# Patient Record
Sex: FEMALE | Race: WHITE | Employment: OTHER | URBAN - METROPOLITAN AREA
[De-identification: names, ages, dates, MRNs, and addresses within clinical notes are randomized per-mention and may not be internally consistent; named-entity substitution may affect disease eponyms.]

---

## 2017-01-27 ENCOUNTER — HOSPITAL ENCOUNTER (OUTPATIENT)
Dept: GENERAL RADIOLOGY | Age: 82
Discharge: HOME | End: 2017-01-27
Attending: INTERNAL MEDICINE
Payer: MEDICARE

## 2017-01-27 DIAGNOSIS — K22.4 ESOPHAGOSPASM: ICD-10-CM

## 2017-01-27 DIAGNOSIS — K21.9 ESOPHAGEAL REFLUX: ICD-10-CM

## 2017-01-27 DIAGNOSIS — R13.10 SWALLOWING DIFFICULTY: ICD-10-CM

## 2017-01-27 PROCEDURE — 74011000255 HC RX REV CODE- 255: Performed by: INTERNAL MEDICINE

## 2017-01-27 PROCEDURE — 74011000250 HC RX REV CODE- 250: Performed by: INTERNAL MEDICINE

## 2017-01-27 PROCEDURE — 74220 X-RAY XM ESOPHAGUS 1CNTRST: CPT

## 2017-01-27 RX ADMIN — BARIUM SULFATE 130 ML: 980 POWDER, FOR SUSPENSION ORAL at 10:26

## 2017-01-27 RX ADMIN — BARIUM SULFATE 100 G: 960 POWDER, FOR SUSPENSION ORAL at 10:25

## 2017-01-27 RX ADMIN — BARIUM SULFATE 700 MG: 700 TABLET ORAL at 10:27

## 2017-01-27 RX ADMIN — ANTACID/ANTIFLATULENT 4 G: 380; 550; 10; 10 GRANULE, EFFERVESCENT ORAL at 10:26

## 2017-04-26 ENCOUNTER — HOSPITAL ENCOUNTER (OUTPATIENT)
Dept: MRI IMAGING | Age: 82
Discharge: HOME | End: 2017-04-26
Attending: PSYCHIATRY & NEUROLOGY
Payer: MEDICARE

## 2017-04-26 DIAGNOSIS — R41.3 MEMORY LOSS: ICD-10-CM

## 2017-04-26 PROCEDURE — 70551 MRI BRAIN STEM W/O DYE: CPT

## 2019-09-04 ENCOUNTER — HOSPITAL ENCOUNTER (OUTPATIENT)
Dept: PREADMISSION TESTING | Age: 84
Discharge: HOME | End: 2019-09-04
Attending: SURGERY
Payer: MEDICARE

## 2019-09-04 LAB
ALBUMIN SERPL-MCNC: 3.4 G/DL (ref 3.4–5)
ALBUMIN/GLOB SERPL: 1.1 {RATIO} (ref 0.8–1.7)
ALP SERPL-CCNC: 91 U/L (ref 45–117)
ALT SERPL-CCNC: 30 U/L (ref 13–56)
ANION GAP SERPL CALC-SCNC: 7 MMOL/L (ref 3–18)
AST SERPL-CCNC: 28 U/L (ref 10–38)
ATRIAL RATE: 74 BPM
BASOPHILS # BLD: 0 K/UL (ref 0–0.1)
BASOPHILS NFR BLD: 0 % (ref 0–2)
BILIRUB SERPL-MCNC: 0.4 MG/DL (ref 0.2–1)
BUN SERPL-MCNC: 24 MG/DL (ref 7–18)
BUN/CREAT SERPL: 28 (ref 12–20)
CALCIUM SERPL-MCNC: 8.7 MG/DL (ref 8.5–10.1)
CALCULATED P AXIS, ECG09: 75 DEGREES
CALCULATED R AXIS, ECG10: 64 DEGREES
CALCULATED T AXIS, ECG11: 44 DEGREES
CHLORIDE SERPL-SCNC: 109 MMOL/L (ref 100–111)
CO2 SERPL-SCNC: 30 MMOL/L (ref 21–32)
CREAT SERPL-MCNC: 0.87 MG/DL (ref 0.6–1.3)
DIAGNOSIS, 93000: NORMAL
DIFFERENTIAL METHOD BLD: ABNORMAL
EOSINOPHIL # BLD: 0 K/UL (ref 0–0.4)
EOSINOPHIL NFR BLD: 1 % (ref 0–5)
ERYTHROCYTE [DISTWIDTH] IN BLOOD BY AUTOMATED COUNT: 15.1 % (ref 11.6–14.5)
GLOBULIN SER CALC-MCNC: 3.2 G/DL (ref 2–4)
GLUCOSE SERPL-MCNC: 81 MG/DL (ref 74–99)
HCT VFR BLD AUTO: 44.2 % (ref 35–45)
HGB BLD-MCNC: 14.3 G/DL (ref 12–16)
LYMPHOCYTES # BLD: 1.2 K/UL (ref 0.9–3.6)
LYMPHOCYTES NFR BLD: 22 % (ref 21–52)
MCH RBC QN AUTO: 30.4 PG (ref 24–34)
MCHC RBC AUTO-ENTMCNC: 32.4 G/DL (ref 31–37)
MCV RBC AUTO: 93.8 FL (ref 74–97)
MONOCYTES # BLD: 0.5 K/UL (ref 0.05–1.2)
MONOCYTES NFR BLD: 9 % (ref 3–10)
NEUTS SEG # BLD: 3.7 K/UL (ref 1.8–8)
NEUTS SEG NFR BLD: 68 % (ref 40–73)
P-R INTERVAL, ECG05: 170 MS
PLATELET # BLD AUTO: 212 K/UL (ref 135–420)
PMV BLD AUTO: 9.6 FL (ref 9.2–11.8)
POTASSIUM SERPL-SCNC: 4.2 MMOL/L (ref 3.5–5.5)
PROT SERPL-MCNC: 6.6 G/DL (ref 6.4–8.2)
Q-T INTERVAL, ECG07: 418 MS
QRS DURATION, ECG06: 102 MS
QTC CALCULATION (BEZET), ECG08: 463 MS
RBC # BLD AUTO: 4.71 M/UL (ref 4.2–5.3)
SODIUM SERPL-SCNC: 146 MMOL/L (ref 136–145)
VENTRICULAR RATE, ECG03: 74 BPM
WBC # BLD AUTO: 5.4 K/UL (ref 4.6–13.2)

## 2019-09-04 PROCEDURE — 80053 COMPREHEN METABOLIC PANEL: CPT

## 2019-09-04 PROCEDURE — 85025 COMPLETE CBC W/AUTO DIFF WBC: CPT

## 2019-09-04 PROCEDURE — 36415 COLL VENOUS BLD VENIPUNCTURE: CPT

## 2019-09-04 PROCEDURE — 93005 ELECTROCARDIOGRAM TRACING: CPT

## 2019-09-04 NOTE — H&P (VIEW-ONLY)
PATIENT: Bandar Chavarria  YOB: 1935  DATE: 08/12/2019 2:45 PM   VISIT TYPE: Consult  HISTORIAN: self  _____________________________________________________________    Completed Orders (this encounter)  Order Interpretation Result Next Lab Date   Dietary education for weight gain        Assessment/Plan  # Detail Type Description    1. 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 Impression left hand SCC, discussed excision in the OR. Patient Plan excision SCC left hand         2. Assessment Body mass index (BMI) 21.0-21.9, adult (Z68.21). Plan Orders Today's instructions / counseling include(s) Dietary education for weight gain. This 81year old female presents for Skin Lesion. History of Present Illness:  1. Skin Lesion   The patient presents with Skin Lesion that began gradually. The problem is moderate, worsened and occurs continuously. Area(s) of concern include the dorsum left hand. The lesion(s) of concern is described as flesh colored color and ulcerated. Aggravating factors include scratching. The patient does not report any relieving factors. The patient reports no change in size and shape of the mole(s). Additional information: 81year old female being seen today for a skin lesion. Patient's son states she has a squamous cell cancer on her left wrist and forearm . It was frozen twice but has never fallen off. PROBLEM LIST:   Problem List reviewed.    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     Gastric reflux 01/26/2017 N     Abdominal aortic aneurysm 01/18/2016 N     Difficulty swallowing solids 01/18/2016 N           PAST MEDICAL/SURGICAL HISTORY  (Detailed)    Disease/disorder Onset Date Management Date Comments   Dysphagia  EGD w/biopsy 12/28/2015    Mitral Valve Prolapse  Surgical intervention 2013    PNA       Cancer       Migraine       Heart disease       Primary open angle glaucoma         Family History  (Detailed)  Relationship Family Member Name  Age at Death Condition Onset Age Cause of Death   Father  Y 61      Mother  Y 80          Social History:  (Detailed)  Tobacco use reviewed. The patient is right-handed. Preferred language is [place]. MARITAL STATUS/FAMILY/SOCIAL SUPPORT  Currently . CHILDREN  Has children:  Tobacco use status: Ex-cigarette smoker. Smoking status: Former smoker. SMOKING STATUS  Use Status Type Smoking Status Usage Per Day Years Used Total Pack Years   yes Cigarette Former smoker        TOBACCO/VAPING EXPOSURE  There is passive smoke exposure. ALCOHOL  There is a history of alcohol use. Type: Wine. 1 glass consumed yearly. CAFFEINE  The patient uses caffeine: tea - 2 cups a day. LIFESTYLE  Moderate activity level. Exercises daily.           Medications (active prior to today)  Medication Instructions Start Date Stop Date Refilled Elsewhere   aspirin 81 mg tablet,delayed release take 1 tablet by oral route  every day 2015   N   nitroglycerin 0.4 mg sublingual tablet place 1 tablet by sublingual route at the 1st sign of difficulty swallowing ; may repeat every 5 min until relief; persists 2017   N   simvastatin 40 mg tablet take 1 tablet by oral route  every day in the evening 2018 N   metoprolol succinate ER 50 mg tablet,extended release 24 hr take 0.5 tablet by oral route  every day 2018 N   memantine 10 mg tablet take 1 tablet by oral route 2 times every day 2019   N   Voltaren 1 % topical gel apply (2G)  by topical route 4 times every day to the affected knee 2019 N   omeprazole 40 mg capsule,delayed release take 1 capsule by oral route  every day before breakfast 2019 N   latanoprost 0.005 % eye drops instill 1 drop by ophthalmic route  every day into both eyes in the evening 06/21/2019 06/21/2019 N     Patient Status   Completed with information received for patient transitioning into care. Medication Reconciliation  Medications reconciled today. Medication Reviewed  Adherence Medication Name Sig Desc Elsewhere Status   taking as directed omeprazole 40 mg capsule,delayed release take 1 capsule by oral route  every day before breakfast N Verified   taking as directed simvastatin 40 mg tablet take 1 tablet by oral route  every day in the evening N Verified   taking as directed memantine 10 mg tablet take 1 tablet by oral route 2 times every day N Verified   taking as directed latanoprost 0.005 % eye drops instill 1 drop by ophthalmic route  every day into both eyes in the evening N Verified   taking as directed metoprolol succinate ER 50 mg tablet,extended release 24 hr take 0.5 tablet by oral route  every day N Verified   taking as directed aspirin 81 mg tablet,delayed release take 1 tablet by oral route  every day N Verified   taking as directed nitroglycerin 0.4 mg sublingual tablet place 1 tablet by sublingual route at the 1st sign of difficulty swallowing ; may repeat every 5 min until relief; persists N Verified   taking as directed Voltaren 1 % topical gel apply (2G)  by topical route 4 times every day to the affected knee N Verified     Allergies:  Ingredient Reaction (Severity) Medication Name Comment   AMOXICILLIN TRIHYDRATE Rash AUGMENTIN    ATROPINE      BELLADONNA ALKALOIDS      CEPHALEXIN MONOHYDRATE Rash KEFLEX    CODEINE Trouble Breathing     MORPHINE Trouble Breathing     OXYMETAZOLINE      POTASSIUM CLAVULANATE Rash AUGMENTIN    PROCHLORPERAZINE  Compazine    PROCHLORPERAZINE EDISYLATE  Compazine    PROCHLORPERAZINE MALEATE  Compazine    PROPOFOL  Diprivan    SULFA (SULFONAMIDE ANTIBIOTICS) Anaphylaxis     Reviewed, no changes.     Review of Systems  System Neg/Pos Details   Constitutional Negative Fever, Night sweats and Weight loss. ENMT Negative Hearing loss, Tinnitus, Vertigo and Voice change. Eyes Negative Diplopia and Vision loss. Respiratory Negative Asthma, Cough, Dyspnea, Hemoptysis, Known TB exposure and Wheezing. Cardio Positive Chest pain. Cardio Negative Claudication, Edema, Irregular heartbeat/palpitations and Thrombophlebitis. Cardio Comments Mild chest pain with exertion. GI Negative Bloating, Dysphagia, Hemorrhoids, Jaundice and Reflux.  Negative Dysuria, Nocturia, Passage stone/gravel and Urinary incontinence. Endocrine Negative Cold intolerance and Goiter. Neuro Negative Headache and Syncope. Integumentary Positive Skin lesion. Integumentary Negative Change in shape/size of mole(s). MS Positive Back pain, Bone/joint symptoms, Joint pain. Hema/Lymph Negative Easy bleeding and Easy bruising. Allergic/Immuno Negative Contact allergy and Contact dermatitis. Vital Signs     Height  Time ft in cm Last Measured Height Position   2:52 PM 5.0 4.00 162.56 08/12/2019 Standing     Weight/BSA/BMI  Time lb oz kg Context BMI kg/m2 BSA m2   2:52 .60  56.971 dressed with shoes 21.56      Blood Pressure  Time BP mm/Hg Position Side Site Method Cuff Size   2:52 /60 sitting right arm manual adult large     Temperature/Pulse/Respiration  Time Temp F Temp C Temp Site Pulse/min Pattern Resp/ min   2:52 PM 98.00 36.67 oral 72 regular 12     Pulse Oximetry/FIO2  Time Pulse Ox (Rest %) Pulse Ox (Amb %) O2 Sat O2 L/Min Timing FiO2 % L/min Delivery Method Finger Probe   2:52 PM 98  RA      L Index     Pain Scale  Time Pain Score Method   2:52 PM 0/10 Numeric Pain Intensity Scale     Measured By  Time Measured by   2:52 PM Charlee Mooney       Physical Exam:  Exam Findings Details   Constitutional Normal No acute distress. Well nourished. Well developed.    Eyes Normal General - Right: Normal, Left: Normal. Sclera - Right: Normal, Left: Normal.   Neck Exam Normal Inspection - Normal.   Respiratory Normal Cough - Absent. Effort - Normal.   Cardiovascular Normal Heart rate - Regular rate. Rhythm - Regular. Skin * Body areas examined - Left upper ext. Detailed inspection - Visual lesions: nodule(s), Color: flesh, Shape: umbilicated, Size: 1cm, Side: left, Location: hand. Extremity Normal No Cyanosis. No Edema. Neurological Normal Level of consciousness - Normal. Orientation - Normal. Memory - Normal. Hand dominance - Right-handed. Psychiatric Normal Orientation - Oriented to time, place, person & situation. No agitation. Not anxious. Appropriate mood and affect.          Medications (added, continued, or stopped this visit):  Start Date Medication Directions PRN Status PRN Reason Instruction Stop Date   12/22/2015 aspirin 81 mg tablet,delayed release take 1 tablet by oral route  every day N      06/21/2019 latanoprost 0.005 % eye drops instill 1 drop by ophthalmic route  every day into both eyes in the evening N      01/16/2019 memantine 10 mg tablet take 1 tablet by oral route 2 times every day N      12/27/2018 metoprolol succinate ER 50 mg tablet,extended release 24 hr take 0.5 tablet by oral route  every day N      04/27/2017 nitroglycerin 0.4 mg sublingual tablet place 1 tablet by sublingual route at the 1st sign of difficulty swallowing ; may repeat every 5 min until relief; persists N      06/19/2019 omeprazole 40 mg capsule,delayed release take 1 capsule by oral route  every day before breakfast N      09/18/2018 simvastatin 40 mg tablet take 1 tablet by oral route  every day in the evening N      02/06/2019 Voltaren 1 % topical gel apply (2G)  by topical route 4 times every day to the affected knee N          Active Patient Care Team Members    Name Contact Agency Type Support Role Relationship Active Date Inactive Date Specialty   Atif Ballesteros   Patient provider PCP   Family Practice   Atif Ballesteros   primary care provider    Family Pract   Renata Reed encounter provider    Gastroenterology       Provider: Lety Velazquez MD 08/12/2019 02:45 PM  Document generated by:  Lety Velazquez 08/13/2019 11:49 AM                           Electronically signed by Leyt Velazquez MD on 08/13/2019 12:10 PM

## 2019-09-11 ENCOUNTER — ANESTHESIA EVENT (OUTPATIENT)
Dept: SURGERY | Age: 84
End: 2019-09-11
Payer: MEDICARE

## 2019-09-11 RX ORDER — FLUMAZENIL 0.1 MG/ML
0.2 INJECTION INTRAVENOUS
Status: CANCELLED | OUTPATIENT
Start: 2019-09-11

## 2019-09-11 RX ORDER — ONDANSETRON 2 MG/ML
4 INJECTION INTRAMUSCULAR; INTRAVENOUS ONCE
Status: CANCELLED | OUTPATIENT
Start: 2019-09-11 | End: 2019-09-11

## 2019-09-11 RX ORDER — ALBUTEROL SULFATE 0.83 MG/ML
2.5 SOLUTION RESPIRATORY (INHALATION) AS NEEDED
Status: CANCELLED | OUTPATIENT
Start: 2019-09-11

## 2019-09-11 RX ORDER — FENTANYL CITRATE 50 UG/ML
12.5 INJECTION, SOLUTION INTRAMUSCULAR; INTRAVENOUS AS NEEDED
Status: CANCELLED | OUTPATIENT
Start: 2019-09-11

## 2019-09-11 RX ORDER — KETOROLAC TROMETHAMINE 30 MG/ML
15 INJECTION, SOLUTION INTRAMUSCULAR; INTRAVENOUS
Status: CANCELLED | OUTPATIENT
Start: 2019-09-11 | End: 2019-09-12

## 2019-09-11 RX ORDER — SODIUM CHLORIDE, SODIUM LACTATE, POTASSIUM CHLORIDE, CALCIUM CHLORIDE 600; 310; 30; 20 MG/100ML; MG/100ML; MG/100ML; MG/100ML
1000 INJECTION, SOLUTION INTRAVENOUS CONTINUOUS
Status: CANCELLED | OUTPATIENT
Start: 2019-09-11

## 2019-09-11 RX ORDER — NALOXONE HYDROCHLORIDE 0.4 MG/ML
0.2 INJECTION, SOLUTION INTRAMUSCULAR; INTRAVENOUS; SUBCUTANEOUS AS NEEDED
Status: CANCELLED | OUTPATIENT
Start: 2019-09-11

## 2019-09-11 RX ORDER — FENTANYL CITRATE 50 UG/ML
25 INJECTION, SOLUTION INTRAMUSCULAR; INTRAVENOUS
Status: CANCELLED | OUTPATIENT
Start: 2019-09-11

## 2019-09-11 RX ORDER — DIPHENHYDRAMINE HYDROCHLORIDE 50 MG/ML
12.5 INJECTION, SOLUTION INTRAMUSCULAR; INTRAVENOUS
Status: CANCELLED | OUTPATIENT
Start: 2019-09-11

## 2019-09-11 NOTE — H&P
PATIENT: Neo Tamez  YOB: 1935  DATE: 08/12/2019 2:45 PM   VISIT TYPE: Consult  HISTORIAN: self  _____________________________________________________________    Completed Orders (this encounter)  Order Interpretation Result Next Lab Date   Dietary education for weight gain        Assessment/Plan  # Detail Type Description    1. 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 Impression left hand SCC, discussed excision in the OR. Patient Plan excision SCC left hand         2. Assessment Body mass index (BMI) 21.0-21.9, adult (Z68.21). Plan Orders Today's instructions / counseling include(s) Dietary education for weight gain. This 81year old female presents for Skin Lesion. History of Present Illness:  1. Skin Lesion   The patient presents with Skin Lesion that began gradually. The problem is moderate, worsened and occurs continuously. Area(s) of concern include the dorsum left hand. The lesion(s) of concern is described as flesh colored color and ulcerated. Aggravating factors include scratching. The patient does not report any relieving factors. The patient reports no change in size and shape of the mole(s). Additional information: 81year old female being seen today for a skin lesion. Patient's son states she has a squamous cell cancer on her left wrist and forearm . It was frozen twice but has never fallen off. PROBLEM LIST:   Problem List reviewed.    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     Gastric reflux 01/26/2017 N     Abdominal aortic aneurysm 01/18/2016 N     Difficulty swallowing solids 01/18/2016 N           PAST MEDICAL/SURGICAL HISTORY  (Detailed)    Disease/disorder Onset Date Management Date Comments   Dysphagia  EGD w/biopsy 12/28/2015    Mitral Valve Prolapse  Surgical intervention 2013    PNA       Cancer       Migraine       Heart disease       Primary open angle glaucoma         Family History  (Detailed)  Relationship Family Member Name  Age at Death Condition Onset Age Cause of Death   Father  Y 61      Mother  Y 80          Social History:  (Detailed)  Tobacco use reviewed. The patient is right-handed. Preferred language is [place]. MARITAL STATUS/FAMILY/SOCIAL SUPPORT  Currently . CHILDREN  Has children:  Tobacco use status: Ex-cigarette smoker. Smoking status: Former smoker. SMOKING STATUS  Use Status Type Smoking Status Usage Per Day Years Used Total Pack Years   yes Cigarette Former smoker        TOBACCO/VAPING EXPOSURE  There is passive smoke exposure. ALCOHOL  There is a history of alcohol use. Type: Wine. 1 glass consumed yearly. CAFFEINE  The patient uses caffeine: tea - 2 cups a day. LIFESTYLE  Moderate activity level. Exercises daily.           Medications (active prior to today)  Medication Instructions Start Date Stop Date Refilled Elsewhere   aspirin 81 mg tablet,delayed release take 1 tablet by oral route  every day 2015   N   nitroglycerin 0.4 mg sublingual tablet place 1 tablet by sublingual route at the 1st sign of difficulty swallowing ; may repeat every 5 min until relief; persists 2017   N   simvastatin 40 mg tablet take 1 tablet by oral route  every day in the evening 2018 N   metoprolol succinate ER 50 mg tablet,extended release 24 hr take 0.5 tablet by oral route  every day 2018 N   memantine 10 mg tablet take 1 tablet by oral route 2 times every day 2019   N   Voltaren 1 % topical gel apply (2G)  by topical route 4 times every day to the affected knee 2019 N   omeprazole 40 mg capsule,delayed release take 1 capsule by oral route  every day before breakfast 2019 N   latanoprost 0.005 % eye drops instill 1 drop by ophthalmic route  every day into both eyes in the evening 06/21/2019 06/21/2019 N     Patient Status   Completed with information received for patient transitioning into care. Medication Reconciliation  Medications reconciled today. Medication Reviewed  Adherence Medication Name Sig Desc Elsewhere Status   taking as directed omeprazole 40 mg capsule,delayed release take 1 capsule by oral route  every day before breakfast N Verified   taking as directed simvastatin 40 mg tablet take 1 tablet by oral route  every day in the evening N Verified   taking as directed memantine 10 mg tablet take 1 tablet by oral route 2 times every day N Verified   taking as directed latanoprost 0.005 % eye drops instill 1 drop by ophthalmic route  every day into both eyes in the evening N Verified   taking as directed metoprolol succinate ER 50 mg tablet,extended release 24 hr take 0.5 tablet by oral route  every day N Verified   taking as directed aspirin 81 mg tablet,delayed release take 1 tablet by oral route  every day N Verified   taking as directed nitroglycerin 0.4 mg sublingual tablet place 1 tablet by sublingual route at the 1st sign of difficulty swallowing ; may repeat every 5 min until relief; persists N Verified   taking as directed Voltaren 1 % topical gel apply (2G)  by topical route 4 times every day to the affected knee N Verified     Allergies:  Ingredient Reaction (Severity) Medication Name Comment   AMOXICILLIN TRIHYDRATE Rash AUGMENTIN    ATROPINE      BELLADONNA ALKALOIDS      CEPHALEXIN MONOHYDRATE Rash KEFLEX    CODEINE Trouble Breathing     MORPHINE Trouble Breathing     OXYMETAZOLINE      POTASSIUM CLAVULANATE Rash AUGMENTIN    PROCHLORPERAZINE  Compazine    PROCHLORPERAZINE EDISYLATE  Compazine    PROCHLORPERAZINE MALEATE  Compazine    PROPOFOL  Diprivan    SULFA (SULFONAMIDE ANTIBIOTICS) Anaphylaxis     Reviewed, no changes.     Review of Systems  System Neg/Pos Details   Constitutional Negative Fever, Night sweats and Weight loss. ENMT Negative Hearing loss, Tinnitus, Vertigo and Voice change. Eyes Negative Diplopia and Vision loss. Respiratory Negative Asthma, Cough, Dyspnea, Hemoptysis, Known TB exposure and Wheezing. Cardio Positive Chest pain. Cardio Negative Claudication, Edema, Irregular heartbeat/palpitations and Thrombophlebitis. Cardio Comments Mild chest pain with exertion. GI Negative Bloating, Dysphagia, Hemorrhoids, Jaundice and Reflux.  Negative Dysuria, Nocturia, Passage stone/gravel and Urinary incontinence. Endocrine Negative Cold intolerance and Goiter. Neuro Negative Headache and Syncope. Integumentary Positive Skin lesion. Integumentary Negative Change in shape/size of mole(s). MS Positive Back pain, Bone/joint symptoms, Joint pain. Hema/Lymph Negative Easy bleeding and Easy bruising. Allergic/Immuno Negative Contact allergy and Contact dermatitis. Vital Signs     Height  Time ft in cm Last Measured Height Position   2:52 PM 5.0 4.00 162.56 08/12/2019 Standing     Weight/BSA/BMI  Time lb oz kg Context BMI kg/m2 BSA m2   2:52 .60  56.971 dressed with shoes 21.56      Blood Pressure  Time BP mm/Hg Position Side Site Method Cuff Size   2:52 /60 sitting right arm manual adult large     Temperature/Pulse/Respiration  Time Temp F Temp C Temp Site Pulse/min Pattern Resp/ min   2:52 PM 98.00 36.67 oral 72 regular 12     Pulse Oximetry/FIO2  Time Pulse Ox (Rest %) Pulse Ox (Amb %) O2 Sat O2 L/Min Timing FiO2 % L/min Delivery Method Finger Probe   2:52 PM 98  RA      L Index     Pain Scale  Time Pain Score Method   2:52 PM 0/10 Numeric Pain Intensity Scale     Measured By  Time Measured by   2:52 PM Arvil Half       Physical Exam:  Exam Findings Details   Constitutional Normal No acute distress. Well nourished. Well developed.    Eyes Normal General - Right: Normal, Left: Normal. Sclera - Right: Normal, Left: Normal.   Neck Exam Normal Inspection - Normal.   Respiratory Normal Cough - Absent. Effort - Normal.   Cardiovascular Normal Heart rate - Regular rate. Rhythm - Regular. Skin * Body areas examined - Left upper ext. Detailed inspection - Visual lesions: nodule(s), Color: flesh, Shape: umbilicated, Size: 1cm, Side: left, Location: hand. Extremity Normal No Cyanosis. No Edema. Neurological Normal Level of consciousness - Normal. Orientation - Normal. Memory - Normal. Hand dominance - Right-handed. Psychiatric Normal Orientation - Oriented to time, place, person & situation. No agitation. Not anxious. Appropriate mood and affect.          Medications (added, continued, or stopped this visit):  Start Date Medication Directions PRN Status PRN Reason Instruction Stop Date   12/22/2015 aspirin 81 mg tablet,delayed release take 1 tablet by oral route  every day N      06/21/2019 latanoprost 0.005 % eye drops instill 1 drop by ophthalmic route  every day into both eyes in the evening N      01/16/2019 memantine 10 mg tablet take 1 tablet by oral route 2 times every day N      12/27/2018 metoprolol succinate ER 50 mg tablet,extended release 24 hr take 0.5 tablet by oral route  every day N      04/27/2017 nitroglycerin 0.4 mg sublingual tablet place 1 tablet by sublingual route at the 1st sign of difficulty swallowing ; may repeat every 5 min until relief; persists N      06/19/2019 omeprazole 40 mg capsule,delayed release take 1 capsule by oral route  every day before breakfast N      09/18/2018 simvastatin 40 mg tablet take 1 tablet by oral route  every day in the evening N      02/06/2019 Voltaren 1 % topical gel apply (2G)  by topical route 4 times every day to the affected knee N          Active Patient Care Team Members    Name Contact Agency Type Support Role Relationship Active Date Inactive Date Specialty   Atif Ballesteros   Patient provider PCP   Family Practice   Atif Ballesteros   primary care provider    Family Pract   Renata Reed encounter provider    Gastroenterology       Provider: Frankie Venegas MD 08/12/2019 02:45 PM  Document generated by:  Frankie Venegas 08/13/2019 11:49 AM                           Electronically signed by Frankie Venegas MD on 08/13/2019 12:10 PM

## 2019-09-12 ENCOUNTER — ANESTHESIA (OUTPATIENT)
Dept: SURGERY | Age: 84
End: 2019-09-12
Payer: MEDICARE

## 2019-09-12 ENCOUNTER — HOSPITAL ENCOUNTER (OUTPATIENT)
Age: 84
Setting detail: OUTPATIENT SURGERY
Discharge: HOME | End: 2019-09-12
Attending: SURGERY | Admitting: SURGERY
Payer: MEDICARE

## 2019-09-12 VITALS
SYSTOLIC BLOOD PRESSURE: 169 MMHG | DIASTOLIC BLOOD PRESSURE: 97 MMHG | RESPIRATION RATE: 16 BRPM | WEIGHT: 128.25 LBS | OXYGEN SATURATION: 100 % | HEIGHT: 63 IN | BODY MASS INDEX: 22.72 KG/M2 | HEART RATE: 61 BPM | TEMPERATURE: 97.7 F

## 2019-09-12 PROCEDURE — 77030002996 HC SUT SLK J&J -A: Performed by: SURGERY

## 2019-09-12 PROCEDURE — 76060000032 HC ANESTHESIA 0.5 TO 1 HR: Performed by: SURGERY

## 2019-09-12 PROCEDURE — 77030040361 HC SLV COMPR DVT MDII -B: Performed by: SURGERY

## 2019-09-12 PROCEDURE — 77030002916 HC SUT ETHLN J&J -A: Performed by: SURGERY

## 2019-09-12 PROCEDURE — 77030011267 HC ELECTRD BLD COVD -A: Performed by: SURGERY

## 2019-09-12 PROCEDURE — 74011000250 HC RX REV CODE- 250: Performed by: SURGERY

## 2019-09-12 PROCEDURE — 88305 TISSUE EXAM BY PATHOLOGIST: CPT

## 2019-09-12 PROCEDURE — 74011250636 HC RX REV CODE- 250/636

## 2019-09-12 PROCEDURE — 74011250636 HC RX REV CODE- 250/636: Performed by: SURGERY

## 2019-09-12 PROCEDURE — 74011000250 HC RX REV CODE- 250

## 2019-09-12 PROCEDURE — 77030018836 HC SOL IRR NACL ICUM -A: Performed by: SURGERY

## 2019-09-12 PROCEDURE — 77030002933 HC SUT MCRYL J&J -A: Performed by: SURGERY

## 2019-09-12 PROCEDURE — 76010000138 HC OR TIME 0.5 TO 1 HR: Performed by: SURGERY

## 2019-09-12 PROCEDURE — 77030020782 HC GWN BAIR PAWS FLX 3M -B: Performed by: SURGERY

## 2019-09-12 PROCEDURE — 76210000021 HC REC RM PH II 0.5 TO 1 HR: Performed by: SURGERY

## 2019-09-12 RX ORDER — LIDOCAINE HYDROCHLORIDE 20 MG/ML
INJECTION, SOLUTION EPIDURAL; INFILTRATION; INTRACAUDAL; PERINEURAL AS NEEDED
Status: DISCONTINUED | OUTPATIENT
Start: 2019-09-12 | End: 2019-09-12 | Stop reason: HOSPADM

## 2019-09-12 RX ORDER — PROPOFOL 10 MG/ML
INJECTION, EMULSION INTRAVENOUS AS NEEDED
Status: DISCONTINUED | OUTPATIENT
Start: 2019-09-12 | End: 2019-09-12 | Stop reason: HOSPADM

## 2019-09-12 RX ORDER — SODIUM CHLORIDE, SODIUM LACTATE, POTASSIUM CHLORIDE, CALCIUM CHLORIDE 600; 310; 30; 20 MG/100ML; MG/100ML; MG/100ML; MG/100ML
125 INJECTION, SOLUTION INTRAVENOUS CONTINUOUS
Status: DISCONTINUED | OUTPATIENT
Start: 2019-09-12 | End: 2019-09-12 | Stop reason: HOSPADM

## 2019-09-12 RX ORDER — BUPIVACAINE HYDROCHLORIDE 5 MG/ML
INJECTION, SOLUTION EPIDURAL; INTRACAUDAL AS NEEDED
Status: DISCONTINUED | OUTPATIENT
Start: 2019-09-12 | End: 2019-09-12 | Stop reason: HOSPADM

## 2019-09-12 RX ORDER — ONDANSETRON 2 MG/ML
INJECTION INTRAMUSCULAR; INTRAVENOUS AS NEEDED
Status: DISCONTINUED | OUTPATIENT
Start: 2019-09-12 | End: 2019-09-12 | Stop reason: HOSPADM

## 2019-09-12 RX ADMIN — ONDANSETRON 4 MG: 2 INJECTION INTRAMUSCULAR; INTRAVENOUS at 08:16

## 2019-09-12 RX ADMIN — PROPOFOL 20 MG: 10 INJECTION, EMULSION INTRAVENOUS at 08:10

## 2019-09-12 RX ADMIN — PROPOFOL 20 MG: 10 INJECTION, EMULSION INTRAVENOUS at 08:06

## 2019-09-12 RX ADMIN — SODIUM CHLORIDE, SODIUM LACTATE, POTASSIUM CHLORIDE, AND CALCIUM CHLORIDE 125 ML/HR: 600; 310; 30; 20 INJECTION, SOLUTION INTRAVENOUS at 07:34

## 2019-09-12 RX ADMIN — LIDOCAINE HYDROCHLORIDE 40 MG: 20 INJECTION, SOLUTION EPIDURAL; INFILTRATION; INTRACAUDAL; PERINEURAL at 08:06

## 2019-09-12 NOTE — ANESTHESIA POSTPROCEDURE EVALUATION
Post-Anesthesia Evaluation and Assessment    Cardiovascular Function/Vital Signs  Visit Vitals  BP (!) 156/97 (BP 1 Location: Right arm, BP Patient Position: At rest)   Pulse 63   Temp 36.5 °C (97.7 °F)   Resp 16   Ht 5' 3\" (1.6 m)   Wt 58.2 kg (128 lb 4 oz)   SpO2 98%   BMI 22.72 kg/m²       Patient is status post Procedure(s):  EXCISION LEFT HAND SQUAMOUS CELL CANCER AND LEFT ARM SKIN LESION TIMES TWO \"SPEC POP\". Nausea/Vomiting: Controlled. Postoperative hydration reviewed and adequate. Pain:  Pain Scale 1: Numeric (0 - 10) (09/12/19 0858)  Pain Intensity 1: 0 (09/12/19 0858)   Managed. Neurological Status:   Neuro (WDL): Within Defined Limits (09/12/19 0858)   At baseline. Mental Status and Level of Consciousness: Arousable. Pulmonary Status:   O2 Device: Room air (09/12/19 0858)   Adequate oxygenation and airway patent. Complications related to anesthesia: None    Post-anesthesia assessment completed. No concerns. Patient has met all discharge requirements.     Signed By: Kendra Thrasher CRNA    September 12, 2019

## 2019-09-12 NOTE — DISCHARGE INSTRUCTIONS
DISCHARGE SUMMARY from Nurse    PATIENT INSTRUCTIONS:    After general anesthesia or intravenous sedation, for 24 hours or while taking prescription Narcotics:  · Limit your activities  · Do not drive and operate hazardous machinery  · Do not make important personal or business decisions  · Do  not drink alcoholic beverages  · If you have not urinated within 8 hours after discharge, please contact your surgeon on call. Report the following to your surgeon:  · Excessive pain, swelling, redness or odor of or around the surgical area  · Temperature over 100.5  · Nausea and vomiting lasting longer than 4 hours or if unable to take medications  · Any signs of decreased circulation or nerve impairment to extremity: change in color, persistent  numbness, tingling, coldness or increase pain  · Any questions    What to do at Home:  [facility] 1 WEEK CALL FOR APPT    If you experience any of the following symptoms heavy bleeding, fevers, severe pain, please follow up with dr Anila Perez    *  Please give a list of your current medications to your Primary Care Provider. *  Please update this list whenever your medications are discontinued, doses are      changed, or new medications (including over-the-counter products) are added. *  Please carry medication information at all times in case of emergency situations. These are general instructions for a healthy lifestyle:    No smoking/ No tobacco products/ Avoid exposure to second hand smoke  Surgeon General's Warning:  Quitting smoking now greatly reduces serious risk to your health.     Obesity, smoking, and sedentary lifestyle greatly increases your risk for illness    A healthy diet, regular physical exercise & weight monitoring are important for maintaining a healthy lifestyle    You may be retaining fluid if you have a history of heart failure or if you experience any of the following symptoms: Weight gain of 3 pounds or more overnight or 5 pounds in a week, increased swelling in our hands or feet or shortness of breath while lying flat in bed. Please call your doctor as soon as you notice any of these symptoms; do not wait until your next office visit. The discharge information has been reviewed with the patient and caregiver. The patient and caregiver verbalized understanding. Discharge medications reviewed with the patient and caregiver and appropriate educational materials and side effects teaching were provided.   ___________________________________________________________________________________________________________________________________    Patient armband removed and shredded

## 2019-09-12 NOTE — INTERVAL H&P NOTE
H&P Update:  Karan Ayala was seen and examined. History and physical has been reviewed.  Significant clinical changes have occurred as noted:  She has 2 small left arm lesions that need excision as well, both suspicious for skin cancer

## 2019-09-12 NOTE — INTERVAL H&P NOTE
[facility]P Update[facility]  [facility] [place] [facility]. Patient identified by surgeon; surgical site was confirmed by patient and surgeon.

## 2019-09-12 NOTE — ANESTHESIA PREPROCEDURE EVALUATION
Relevant Problems   No relevant active problems       Anesthetic History   No history of anesthetic complications            Review of Systems / Medical History  Patient summary reviewed, nursing notes reviewed and pertinent labs reviewed    Pulmonary                Comments: Pulmonary HTN   Neuro/Psych   Within defined limits           Cardiovascular    Hypertension  Valvular problems/murmurs: mitral insufficiency      Dysrhythmias   CAD         GI/Hepatic/Renal     GERD: well controlled      PUD     Endo/Other        Arthritis     Other Findings   Comments: Pt poor historian           Physical Exam    Airway  Mallampati: II  TM Distance: 4 - 6 cm  Neck ROM: normal range of motion   Mouth opening: Normal     Cardiovascular  Regular rate and rhythm,  S1 and S2 normal,  no murmur, click, rub, or gallop             Dental  No notable dental hx       Pulmonary  Breath sounds clear to auscultation               Abdominal  GI exam deferred       Other Findings            Anesthetic Plan    ASA: 3  Anesthesia type: MAC            Anesthetic plan and risks discussed with: Patient      Propofol documented in \"allergies\". Pt received propofol for a MANUELA in 2013 without documented complications.

## 2019-09-12 NOTE — OP NOTES
[facility] [place]  OPERATIVE REPORT    Name:  Maryann Layton  MR#:   [id number]  :  1935  ACCOUNT #:  [de-identified]  DATE OF SERVICE:  2019    PREOPERATIVE DIAGNOSIS:  Squamous cell cancer, left hand, left arm x2. POSTOPERATIVE DIAGNOSIS:  Squamous cell cancer, left hand, left arm x2. PROCEDURE PERFORMED:  Excision of squamous cell cancer, left hand, measuring 12 mm with 3 mm margins and layered closure; excision of left arm squamous cell cancer x2 measuring 6 and 8 mm with 2 mm margins with layered closure. SURGEON:  Angela Eduardo MD    ASSISTANT:  Rl Helms. ANESTHESIA:  MAC.    COMPLICATIONS:  No complications. SPECIMENS REMOVED:  Left hand skin lesion and left arm skin lesion x2. DRAINS:  No drains. IMPLANTS:  No implants. ESTIMATED BLOOD LOSS:  3 mL. INDICATIONS FOR PROCEDURE:  This is an 52-year-old female with several skin lesions suspicious for squamous cell cancer, and she is brought to the operating room for excision. DESCRIPTION OF PROCEDURE:  The patient was brought into the operating room, placed on the table in the supine position. After placing monitors and adequate IV sedation, the left arm and hand were prepped and draped in the usual sterile fashion. Marcaine was injected locally in the skin and subcutaneous tissue. The hand lesion was excised in a longitudinal fashion through the skin down to subcutaneous tissue. Then, using electrocautery dissection, fibroadipose tissue was divided. The specimen was marked with a silk suture at the proximal margin and it was sent to Pathology. Skin flaps were created with the electrocautery. Subcutaneous tissue was reapproximated with interrupted 3-0 Monocryl suture and 4-0 nylon was used to reapproximate the skin. The two arm skin lesions were then excised. Elliptical incision was made in a longitudinal fashion around each lesion through the skin down to subcutaneous tissue.   Then, using electrocautery dissection, fibroadipose tissue was divided. Each lesion was sent separately in formalin to Pathology, labeled left arm radial and left arm ulnar. Skin flaps were created with the electrocautery. Subcutaneous tissue was reapproximated with interrupted 3-0 Monocryl suture and 4-0 nylon was used to reapproximate the skin. The wound was dressed with Xeroform gauze and an Ace wrap. The sponge, instrument, needle count was correct at the end of the procedure.       MD SHARI [id number]  D:  09/12/2019 8:42  T:  09/12/2019 8:49  JOB #:  [id number]

## 2020-01-07 ENCOUNTER — HOSPITAL ENCOUNTER (OUTPATIENT)
Dept: NON INVASIVE DIAGNOSTICS | Age: 85
Discharge: HOME | End: 2020-01-07
Attending: INTERNAL MEDICINE
Payer: MEDICARE

## 2020-01-07 ENCOUNTER — HOSPITAL ENCOUNTER (OUTPATIENT)
Dept: NUCLEAR MEDICINE | Age: 85
Discharge: HOME | End: 2020-01-07
Attending: INTERNAL MEDICINE
Payer: MEDICARE

## 2020-01-07 VITALS
BODY MASS INDEX: 22.68 KG/M2 | HEIGHT: 63 IN | DIASTOLIC BLOOD PRESSURE: 86 MMHG | WEIGHT: 128 LBS | SYSTOLIC BLOOD PRESSURE: 138 MMHG

## 2020-01-07 DIAGNOSIS — R55 SYNCOPE: ICD-10-CM

## 2020-01-07 DIAGNOSIS — I34.0 MITRAL REGURGITATION: ICD-10-CM

## 2020-01-07 LAB
AV VELOCITY RATIO: 0.78
AV VTI RATIO: 0.7
ECHO AO ASC DIAM: 3.08 CM
ECHO AO ROOT DIAM: 3.79 CM
ECHO AV AREA PEAK VELOCITY: 2.7 CM2
ECHO AV AREA VTI: 2.5 CM2
ECHO AV AREA/BSA PEAK VELOCITY: 1.7 CM2/M2
ECHO AV AREA/BSA VTI: 1.6 CM2/M2
ECHO AV MEAN GRADIENT: 2.6 MMHG
ECHO AV MEAN VELOCITY: 0.79 M/S
ECHO AV PEAK GRADIENT: 3.7 MMHG
ECHO AV PEAK VELOCITY: 95.59 CM/S
ECHO AV VTI: 14.94 CM
ECHO EST RA PRESSURE: 5 MMHG
ECHO IVC PROX: 2.04 CM
ECHO LA MAJOR AXIS: 3.56 CM
ECHO LA VOL 2C: 30.81 ML (ref 22–52)
ECHO LA VOL 4C: 28.76 ML (ref 22–52)
ECHO LA VOL BP: 35.7 ML (ref 22–52)
ECHO LA VOL/BSA BIPLANE: 22.32 ML/M2 (ref 16–28)
ECHO LA VOLUME INDEX A2C: 19.26 ML/M2 (ref 16–28)
ECHO LA VOLUME INDEX A4C: 17.98 ML/M2 (ref 16–28)
ECHO LV E' LATERAL VELOCITY: 11 CM/S
ECHO LV E' SEPTAL VELOCITY: 7 CM/S
ECHO LV EDV A2C: 64.5 ML
ECHO LV EDV A4C: 52.3 ML
ECHO LV EDV BP: 57.9 ML (ref 56–104)
ECHO LV EDV INDEX A4C: 32.7 ML/M2
ECHO LV EDV INDEX BP: 36.2 ML/M2
ECHO LV EDV NDEX A2C: 40.3 ML/M2
ECHO LV EDV TEICHHOLZ: 33.1 ML
ECHO LV EJECTION FRACTION A2C: 56 %
ECHO LV EJECTION FRACTION A4C: 53 %
ECHO LV EJECTION FRACTION BIPLANE: 53.8 % (ref 55–100)
ECHO LV ESV A2C: 28.2 ML
ECHO LV ESV A4C: 24.4 ML
ECHO LV ESV BP: 26.8 ML (ref 19–49)
ECHO LV ESV INDEX A2C: 17.6 ML/M2
ECHO LV ESV INDEX A4C: 15.3 ML/M2
ECHO LV ESV INDEX BP: 16.8 ML/M2
ECHO LV ESV TEICHHOLZ: 16.12 ML
ECHO LV INTERNAL DIMENSION DIASTOLIC: 3.57 CM (ref 3.9–5.3)
ECHO LV INTERNAL DIMENSION SYSTOLIC: 2.65 CM
ECHO LV IVSD: 1.14 CM (ref 0.6–0.9)
ECHO LV MASS 2D: 145.5 G (ref 67–162)
ECHO LV MASS INDEX 2D: 91 G/M2 (ref 43–95)
ECHO LV POSTERIOR WALL DIASTOLIC: 1.12 CM (ref 0.6–0.9)
ECHO LVOT DIAM: 2.11 CM
ECHO LVOT PEAK GRADIENT: 2.2 MMHG
ECHO LVOT PEAK VELOCITY: 74.31 CM/S
ECHO LVOT VTI: 10.67 CM
ECHO MV A VELOCITY: 0.15 CM/S
ECHO MV AREA PHT: 8.4 CM2
ECHO MV E DECELERATION TIME (DT): 89.9 MS
ECHO MV E VELOCITY: 139.51 CM/S
ECHO MV E/A RATIO: 930.07
ECHO MV E/E' LATERAL: 12.68
ECHO MV E/E' RATIO (AVERAGED): 16.31
ECHO MV E/E' SEPTAL: 19.93
ECHO MV PRESSURE HALF TIME (PHT): 26.1 MS
ECHO MV REGURGITANT PEAK GRADIENT: 139.9 MMHG
ECHO MV REGURGITANT PEAK VELOCITY: 591.45 CM/S
ECHO PULMONARY ARTERY SYSTOLIC PRESSURE (PASP): 27.9 MMHG
ECHO RA AREA 4C: 10.07 CM2
ECHO RIGHT VENTRICULAR SYSTOLIC PRESSURE (RVSP): 27.9 MMHG
ECHO RV INTERNAL DIMENSION: 2.44 CM
ECHO RV TAPSE: 2.4 CM (ref 1.5–2)
ECHO TV REGURGITANT MAX VELOCITY: 239.41 CM/S
ECHO TV REGURGITANT PEAK GRADIENT: 22.9 MMHG
LVFS 2D: 25.55 %
LVOT MG: 1.48 MMHG
LVOT MV: 0.59 CM/S
LVSV (MOD BI): 19.39 ML
LVSV (MOD SINGLE 4C): 17.35 ML
LVSV (MOD SINGLE): 22.6 ML
LVSV (TEICH): 16.98 ML
MV DEC SLOPE: 15.52

## 2020-01-07 PROCEDURE — A9500 TC99M SESTAMIBI: HCPCS

## 2020-01-07 PROCEDURE — 93306 TTE W/DOPPLER COMPLETE: CPT

## 2020-01-07 PROCEDURE — 74011250636 HC RX REV CODE- 250/636: Performed by: INTERNAL MEDICINE

## 2020-01-07 PROCEDURE — 93017 CV STRESS TEST TRACING ONLY: CPT

## 2020-01-07 RX ADMIN — REGADENOSON 0.4 MG: 0.08 INJECTION, SOLUTION INTRAVENOUS at 10:37

## 2020-06-01 LAB
STRESS BASELINE HR: 72 BPM
STRESS ESTIMATED WORKLOAD: 1 METS
STRESS EXERCISE DUR MIN: NORMAL
STRESS PEAK DIAS BP: 94 MMHG
STRESS PEAK SYS BP: 150 MMHG
STRESS PERCENT HR ACHIEVED: 71 %
STRESS POST PEAK HR: 97 BPM
STRESS RATE PRESSURE PRODUCT: NORMAL BPM*MMHG
STRESS ST DEPRESSION: 0 MM
STRESS ST ELEVATION: 0 MM
STRESS TARGET HR: 137 BPM

## (undated) DEVICE — GARMENT,MEDLINE,DVT,INT,CALF,MED, GEN2: Brand: MEDLINE

## (undated) DEVICE — (D)PREP SKN CHLRAPRP APPL 26ML -- CONVERT TO ITEM 371833

## (undated) DEVICE — INSULATED BLADE ELECTRODE: Brand: EDGE

## (undated) DEVICE — SUT ETHLN 4-0 18IN PS2 BLK --

## (undated) DEVICE — BANDAGE COMPR W4INXL15FT BGE E SGL LAYERED CLP CLSR

## (undated) DEVICE — SHEET,DRAPE,40X58,STERILE: Brand: MEDLINE

## (undated) DEVICE — STRAP,POSITIONING,KNEE/BODY,FOAM,4X60": Brand: MEDLINE

## (undated) DEVICE — SUT MONOCRYL PLUS UD 4-0 --

## (undated) DEVICE — SUT ETHLN 3-0 18IN PS2 BLK --

## (undated) DEVICE — REM POLYHESIVE ADULT PATIENT RETURN ELECTRODE: Brand: VALLEYLAB

## (undated) DEVICE — SOL IRRIGATION INJ NACL 0.9% 500ML BTL

## (undated) DEVICE — MASTISOL ADHESIVE LIQ 2/3ML

## (undated) DEVICE — 4-PORT MANIFOLD: Brand: NEPTUNE 2

## (undated) DEVICE — DRESSING,GAUZE,XEROFORM,CURAD,1"X8",ST: Brand: CURAD

## (undated) DEVICE — MINOR: Brand: MEDLINE INDUSTRIES, INC.

## (undated) DEVICE — DRAPE TWL SURG 16X26IN BLU ORB04] ALLCARE INC]

## (undated) DEVICE — SUT SLK 2-0SH 30IN BLK --

## (undated) DEVICE — STERILE POLYISOPRENE POWDER-FREE SURGICAL GLOVES WITH EMOLLIENT COATING: Brand: PROTEXIS

## (undated) DEVICE — STRIP,CLOSURE,WOUND,MEDI-STRIP,1/2X4: Brand: MEDLINE

## (undated) DEVICE — SUT MONOCRYL PLUS UD 3-0 --

## (undated) DEVICE — SPONGE GZ W4XL4IN COT 12 PLY TYP VII WVN C FLD DSGN

## (undated) DEVICE — (D)SYR 10ML 1/5ML GRAD NSAF -- PKGING CHANGE USE ITEM 338027

## (undated) DEVICE — SUTURE MCRYL SZ 3-0 L27IN ABSRB UD L26MM SH 1/2 CIR Y416H